# Patient Record
(demographics unavailable — no encounter records)

---

## 2024-10-21 NOTE — HISTORY OF PRESENT ILLNESS
[FreeTextEntry1] : He returns stating that he thinks he has gout in his left foot.  He states that earlier in the week he started to have mild-to-moderate pain.  It has gotten progressively worse and since today is Friday, he wanted to be seen because he did not know if he could make it through the weekend.  He states that it is throbbing and he has had this before and it usually gets better after treatment.

## 2024-10-21 NOTE — PHYSICAL EXAM
[TextEntry] : The left first MPJ is red, hot, and swollen.  There is pain upon light palpation of the area.  Ecchymosis is absent.

## 2024-10-21 NOTE — ASSESSMENT
[FreeTextEntry1] : Assessment: Acute gouty arthritis, left first MPJ.  Plan:   After further discussion it was agreed to give the patient an injection to reduce inflammation and associated pain and to improve function.  The area to be injected was wiped thoroughly with sterile alcohol.  Then using a combination of 1.0 cc of Celestone acetate suspension combined with 1cc of Dexamethasone sodium phosphate and 1.0cc of Lidocaine plain, an injection was given at the left first MPJ.   Sterile gauze was used for compression and then a sterile Band-Aid was applied to the injection site. The possibility of a flair reaction was discussed with the patient prior to administering the injection.  A compression strapping was applied was applied the foot and ankle for support and to help reduce swelling and pain and improve function.  Patient was given a prescription for 1 Medrol dose pack 4-mg tablets.  The patient was instructed to that they are to take 6 tablets the first day and then taper the dosage by 1 tablet per day over 6 days. I explained that the instructions will be on the package and they are to follow them.  The patient denies any history of seizures or allergies to the medication.  They were advised to stop the medication right away if any side effects were experienced and call the office.  I advised the patient to notify the office right away if increased redness, swelling, pain, open wounds or discharge were observed.  PTR:  1 week if problem persists.

## 2024-12-04 NOTE — HISTORY OF PRESENT ILLNESS
[FreeTextEntry1] : Rao Healy returns first stating that his left foot became swollen over the Thanksgiving weekend.  When he could not get in, he states that it has gotten more and more painful.  He has been taking Advil, which does not help.  He states that he thinks he has gout again.   Secondly, he states that he would like new orthotics before the end of the year if possible.  He states that his other ones are starting to wear out.

## 2024-12-04 NOTE — HISTORY OF PRESENT ILLNESS
[FreeTextEntry1] : Roa Healy returns first stating that his left foot became swollen over the Thanksgiving weekend.  When he could not get in, he states that it has gotten more and more painful.  He has been taking Advil, which does not help.  He states that he thinks he has gout again.   Secondly, he states that he would like new orthotics before the end of the year if possible.  He states that his other ones are starting to wear out.

## 2024-12-04 NOTE — PHYSICAL EXAM
[TextEntry] : The patient exhibited a flatfooted appearance upon weight bearing with collapsed medial arches and lateral bowing of the Achilles tendons. A biomechanical exam was performed on the patient.  The patient was seated in the chair.  Evaluation of the joints of the foot in passive range of motion was performed.  The sub talar joint was evaluated in both neutral and relaxed position.  The sub talar joint is noted to be in a pronated position.  There is also a mild equinus deformity noted.  Upon examination of the forefoot there is a slight forefoot varus present in compensation for the valgus position of the heel and a pronated sub talar joint.  Decreased ankle dorsiflexion was noted bilateral, the affected foot had less dorsiflexion. First metatarsal phalangeal joint dorsiflexion was limited. Resting calcaneal stance position was 5 degrees everted bilateral.  	Gait exam was also performed, which reveals an eversion of the heel bone and mid foot collapse with excessive pronation.  The forefoot is in a compensated varus position during gait as well.  Evaluation of shoe gear reveals excessive lateral heel wear bilateral with considerable breakdown of the heel counter and excessive flexibility of the soles.  The left-first MPJ is swollen and warm to the touch.  It is red in color and painful to light palpation.

## 2024-12-04 NOTE — ASSESSMENT
[FreeTextEntry1] : Assessment: Calcaneal valgus deformities bilaterally. Acute gouty arthritis, left first MPJ.  Plan: The patient was instructed as to the potential biomechanical and protective benefits that orthotic therapy could provide.  The patient was educated in the process of how orthotic devices would promote improved foot function and shock absorption.  A thorough explanation was given to the patient in reference to the benefits orthotic devices may provide in not only relieving the current symptoms, but in also preventing possible recurrence and/or secondary biomechanically related problems.  After further discussion it was agreed to give the patient an injection to reduce inflammation and associated pain and to improve function.  The area to be injected was wiped thoroughly with sterile alcohol.  Then using a combination of 1.0 cc of Celestone acetate suspension combined with 1cc of Dexamethasone sodium phosphate and 1.0cc of Lidocaine plain, an injection was given at the left foot.   Sterile gauze was used for compression and then a sterile Band-Aid was applied to the injection site. The possibility of a flair reaction was discussed with the patient prior to administering the injection.  A compression strapping was applied was applied the foot and ankle for support and to help reduce swelling and pain and improve function.  Patient was given a prescription for 1 Medrol dose pack 4-mg tablets.  The patient was instructed to that they are to take 6 tablets the first day and then taper the dosage by 1 tablet per day over 6 days. I explained that the instructions will be on the package and they are to follow them.  The patient denies any history of seizures or allergies to the medication.  They were advised to stop the medication right away if any side effects were experienced and call the office.  I advised the patient to notify the office right away if increased redness, swelling, pain, open wounds or discharge were observed.  He will return for scanning.

## 2024-12-16 NOTE — ASSESSMENT
[FreeTextEntry1] : Assessment: Calcaneal valgus deformities bilaterally. Gout, left foot.  Plan:   The Sharp Shape 3D laser foot scanner was placed in an upright position and the height of the patient's chair was adjusted so that the patient's feet would be in proper alignment perpendicular to the scanner.  The right foot was placed upon the scanner and held in neutral position with moderate pressure of the foot against the scanner with the plantar aspect of the foot against the scanner.  Orientation of the foot to the scanner was lined up with the central line of the scanner.  The foot pedal was depressed to initiate the first scan.  The integrity of the image scanned was checked and the final scanner file was written to the hard drive. The same procedure was performed on the left foot.  The scanned images were then forwarded to the orthotic lab via E-mail to fabricate supportive accommodative UCB-type orthotics to go into a variety of shoes.  The patient was given a prescription for Meloxicam 15-mg, to treat the inflammation and reduce pain & swelling and help restore normal function. The patient was instructed to take 1 tablet per day after his major meal for 2 weeks, so that it would be less offensive on the stomach.  The patient denied being allergic to the medication and was advised to call the office if they experienced any side effects.  He will return to  his orthotics when available.

## 2024-12-16 NOTE — PHYSICAL EXAM
[TextEntry] : The patient exhibited a flatfooted appearance upon weight bearing with collapsed medial arches and lateral bowing of the Achilles tendons. A biomechanical exam was performed on the patient.  The patient was seated in the chair.  Evaluation of the joints of the foot in passive range of motion was performed.  The sub talar joint was evaluated in both neutral and relaxed position.  The sub talar joint is noted to be in a pronated position.  There is also a mild equinus deformity noted.  Upon examination of the forefoot there is a slight forefoot varus present in compensation for the valgus position of the heel and a pronated sub talar joint.  Decreased ankle dorsiflexion was noted bilateral, the affected foot had less dorsiflexion. First metatarsal phalangeal joint dorsiflexion was limited. Resting calcaneal stance position was 5 degrees everted bilateral.  	Gait exam was also performed, which reveals an eversion of the heel bone and mid foot collapse with excessive pronation.  The forefoot is in a compensated varus position during gait as well.  Evaluation of shoe gear reveals excessive lateral heel wear bilateral with considerable breakdown of the heel counter and excessive flexibility of the soles.  The left first MPJ is much less swollen and ecchymosis is absent.  Skin temperature is slightly elevated.

## 2024-12-16 NOTE — HISTORY OF PRESENT ILLNESS
[FreeTextEntry1] : Rao returns stating his left great toe joint is feeling better.  He states that it is still a little bit painful, but nothing like what it was.  He rates the pain as a 2/10 at most.  He would like to be fitted for new orthotics.

## 2025-04-10 NOTE — PHYSICAL EXAM
[TextEntry] : Patient exhibited a flatfooted appearance upon weight bearing with collapsed medial arches and lateral bowing of the Achilles tendons. The orthotic devices fit well when placed up against the feet.  Length of the plates is adequate bilateral.  A 4-degree varus rearfoot posting was observed, which creates a subtalar joint neutral position during midstance weight bearing. Gait analysis shows improved foot position during early heelstrike gait, and rectus knee alignment throughout gait.  Patient denies foot, ankle, knee or hip pain during the exam.  Patient denies any pressure points or sharp edges. 
.